# Patient Record
Sex: MALE | URBAN - METROPOLITAN AREA
[De-identification: names, ages, dates, MRNs, and addresses within clinical notes are randomized per-mention and may not be internally consistent; named-entity substitution may affect disease eponyms.]

---

## 2022-02-16 ENCOUNTER — OFFICE VISIT (OUTPATIENT)
Dept: URBAN - METROPOLITAN AREA CLINIC 5 | Facility: CLINIC | Age: 77
End: 2022-02-16
Payer: MEDICARE

## 2022-02-16 DIAGNOSIS — H02.834 DERMATOCHALASIS OF LEFT UPPER LID: ICD-10-CM

## 2022-02-16 DIAGNOSIS — Z96.1 PRESENCE OF INTRAOCULAR LENS: ICD-10-CM

## 2022-02-16 DIAGNOSIS — H02.831 DERMATOCHALASIS OF RIGHT UPPER LID: ICD-10-CM

## 2022-02-16 DIAGNOSIS — H34.8322 BRANCH RETINAL VEIN OCCLUSION, STABLE, LEFT EYE: Primary | ICD-10-CM

## 2022-02-16 PROCEDURE — 92004 COMPRE OPH EXAM NEW PT 1/>: CPT | Performed by: OPTOMETRIST

## 2022-02-16 PROCEDURE — 92250 FUNDUS PHOTOGRAPHY W/I&R: CPT | Performed by: OPTOMETRIST

## 2022-02-16 ASSESSMENT — INTRAOCULAR PRESSURE
OS: 10
OD: 9

## 2022-02-16 NOTE — IMPRESSION/PLAN
Impression: Branch retinal vein occlusion, stable, left eye: R10.8850. Plan: Mild hemorrhages so may be partial vein occlusion - baseline photos today. Recommend consult with Dr. Heather Joshi in 3-4 weeks.

## 2022-10-25 ENCOUNTER — OFFICE VISIT (OUTPATIENT)
Dept: URBAN - METROPOLITAN AREA CLINIC 1 | Facility: CLINIC | Age: 77
End: 2022-10-25
Payer: COMMERCIAL

## 2022-10-25 DIAGNOSIS — H34.8320 BRANCH RETINAL VEIN OCCLUSION W/ MACULAR EDEMA, LEFT EYE: Primary | ICD-10-CM

## 2022-10-25 DIAGNOSIS — Z96.1 PRESENCE OF INTRAOCULAR LENS: ICD-10-CM

## 2022-10-25 PROCEDURE — 92134 CPTRZ OPH DX IMG PST SGM RTA: CPT | Performed by: SPECIALIST

## 2022-10-25 PROCEDURE — 99214 OFFICE O/P EST MOD 30 MIN: CPT | Performed by: SPECIALIST

## 2022-10-25 ASSESSMENT — INTRAOCULAR PRESSURE
OD: 11
OS: 7

## 2022-10-25 NOTE — IMPRESSION/PLAN
Impression: Presence of intraocular lens: Z96.1. Plan: PC IOL in good position. Patient doing excellent after Cataract surgery both eyes, Will continue to monitor patient. 

rv 3 mos dfe

## 2022-10-25 NOTE — IMPRESSION/PLAN
Impression: Branch retinal vein occlusion w/ macular edema, left eye: B50.9889. Plan: PATIENT HAS A BRVO IN THE LEFT EYE , WHICH EXPLAINS HOW HE IS SEEING. HE WILL NEED TO SEE A RETINAL SPECIALIST [facility]. WE WILL SEND HIM TO EITHER DR Jay Jay Herrera, [address] [place] [address].

## 2022-11-01 ENCOUNTER — OFFICE VISIT (OUTPATIENT)
Dept: URBAN - METROPOLITAN AREA CLINIC 6 | Facility: CLINIC | Age: 77
End: 2022-11-01
Payer: MEDICARE

## 2022-11-01 DIAGNOSIS — H34.8120 CENTRAL RETINAL VEIN OCCLUSION W/ MACULAR EDEMA, LEFT EYE: Primary | ICD-10-CM

## 2022-11-01 DIAGNOSIS — H04.123 DRY EYE SYNDROME OF BILATERAL LACRIMAL GLANDS: ICD-10-CM

## 2022-11-01 PROCEDURE — 92014 COMPRE OPH EXAM EST PT 1/>: CPT | Performed by: OPHTHALMOLOGY

## 2022-11-01 PROCEDURE — 67028 INJECTION EYE DRUG: CPT | Performed by: OPHTHALMOLOGY

## 2022-11-01 PROCEDURE — 92134 CPTRZ OPH DX IMG PST SGM RTA: CPT | Performed by: OPHTHALMOLOGY

## 2022-11-01 ASSESSMENT — INTRAOCULAR PRESSURE
OS: 11
OD: 12

## 2022-11-01 NOTE — IMPRESSION/PLAN
Impression: Dry eye syndrome of bilateral lacrimal glands: H04.123. Plan: Mild dry eye- Recommend use of high quality artificial tears 3-4x per day prn.

## 2022-11-01 NOTE — IMPRESSION/PLAN
Impression: Central retinal vein occlusion w/ macular edema, left eye: H34.8120. Plan: CRVO with macular edema on moct and Fundus ordered today. R/B/A of off label Avastin (Bevacizumab) injection discussed and the patient elected to proceed. Recommend getting a baseline evaluation with PCP to check CBC/BS/Lipid profile / Hypercoaguable status: Protein S, Protein C, Homocysteine, antithrombin 3, Factor Vleiden and lupus anticoagulant. Baseline FP/MOCT with CME OU. R/B/A of off label Avastin (Bevacizumab) injection discussed and the patient elected to proceed. AVASTIN (Bevacizumab) injection given in the left eye today. Procedure: The patient was properly identified and the risks, benefits, and alternatives were fully discussed with the patient, including the off label use of this medication. All questions were answered, the patient decided to proceed and signed the informed consent. The surgical site was confirmed and a proparacaine drop was placed followed by iodine 5% topically on the eye. The periocular area was then prepped and a sterile eyelid speculum was used to open the lids. An additional 5% iodine drop was placed over the planned injection site. A small amount of subconjunctival lidocaine was placed and then the pars plana was marked 3.75mm posterior to the limbus with a sterile caliper. An additional 5% iodine drop was placed. 0.08 mL of Avastin (Bevacizumab 2.0 mg per 0.08 mL) was injected through the pars plana. An additional drop of iodine was placed. The eyelid speculum was removed. It was verified that the patient could count fingers. Indirect biomicroscopy was performed to view the retina to assure no changes, specifically no holes, tears, or new vitreous hemorrhaging. The injection site was  inspected in the slit lamp and noted to be free of vitreous. Endophthalmitis and retinal detachment warnings were given.

## 2022-12-01 ENCOUNTER — OFFICE VISIT (OUTPATIENT)
Dept: URBAN - METROPOLITAN AREA CLINIC 6 | Facility: CLINIC | Age: 77
End: 2022-12-01
Payer: MEDICARE

## 2022-12-01 DIAGNOSIS — H34.8120 CENTRAL RETINAL VEIN OCCLUSION W/ MACULAR EDEMA, LEFT EYE: Primary | ICD-10-CM

## 2022-12-01 DIAGNOSIS — H04.123 DRY EYE SYNDROME OF BILATERAL LACRIMAL GLANDS: ICD-10-CM

## 2022-12-01 PROCEDURE — 67028 INJECTION EYE DRUG: CPT | Performed by: OPHTHALMOLOGY

## 2022-12-01 PROCEDURE — 92014 COMPRE OPH EXAM EST PT 1/>: CPT | Performed by: OPHTHALMOLOGY

## 2022-12-01 PROCEDURE — 92134 CPTRZ OPH DX IMG PST SGM RTA: CPT | Performed by: OPHTHALMOLOGY

## 2022-12-01 ASSESSMENT — INTRAOCULAR PRESSURE
OD: 11
OS: 10

## 2022-12-01 NOTE — IMPRESSION/PLAN
Impression: Central retinal vein occlusion w/ macular edema, left eye: H34.8191. Plan: Ischemic CRVO with improving macular edema on moct today. R/B/A of off label Avastin (Bevacizumab) injection discussed and the patient elected to proceed. AVASTIN (Bevacizumab) injection given in the left eye today. Procedure: The patient was properly identified and the risks, benefits, and alternatives were fully discussed with the patient, including the off label use of this medication. All questions were answered, the patient decided to proceed and signed the informed consent. The surgical site was confirmed and a proparacaine drop was placed followed by iodine 5% topically on the eye. The periocular area was then prepped and a sterile eyelid speculum was used to open the lids. An additional 5% iodine drop was placed over the planned injection site. A small amount of subconjunctival lidocaine was placed and then the pars plana was marked 3.75mm posterior to the limbus with a sterile caliper. An additional 5% iodine drop was placed. 0.08 mL of Avastin (Bevacizumab 2.0 mg per 0.08 mL) was injected through the pars plana. An additional drop of iodine was placed. The eyelid speculum was removed. It was verified that the patient could count fingers. Indirect biomicroscopy was performed to view the retina to assure no changes, specifically no holes, tears, or new vitreous hemorrhaging. The injection site was  inspected in the slit lamp and noted to be free of vitreous. Endophthalmitis and retinal detachment warnings were given.

## 2022-12-09 ENCOUNTER — OFFICE VISIT (OUTPATIENT)
Dept: URBAN - METROPOLITAN AREA CLINIC 6 | Facility: CLINIC | Age: 77
End: 2022-12-09
Payer: MEDICARE

## 2022-12-09 DIAGNOSIS — H04.123 DRY EYE SYNDROME OF BILATERAL LACRIMAL GLANDS: Primary | ICD-10-CM

## 2022-12-09 DIAGNOSIS — H34.8120 CENTRAL RETINAL VEIN OCCLUSION W/ MACULAR EDEMA, LEFT EYE: ICD-10-CM

## 2022-12-09 PROCEDURE — 92014 COMPRE OPH EXAM EST PT 1/>: CPT | Performed by: OPTOMETRIST

## 2022-12-09 ASSESSMENT — INTRAOCULAR PRESSURE
OD: 12
OS: 14

## 2022-12-09 NOTE — IMPRESSION/PLAN
Impression: Central retinal vein occlusion w/ macular edema, left eye: H34.9389. Plan: The patient had avastin 1 week ago with Dr Bertin River and is improving OS. No signs of change OD. FU as scheduled with Dr Bertin River.

## 2022-12-09 NOTE — IMPRESSION/PLAN
Impression: Dry eye syndrome of bilateral lacrimal glands: H04.123. Plan: Emergency visit for Mild dry eye- Recommend use of high quality artificial tears 3-4x per day prn.

## 2023-01-18 ENCOUNTER — OFFICE VISIT (OUTPATIENT)
Dept: URBAN - METROPOLITAN AREA CLINIC 6 | Facility: CLINIC | Age: 78
End: 2023-01-18
Payer: MEDICARE

## 2023-01-18 DIAGNOSIS — H34.8120 CENTRAL RETINAL VEIN OCCLUSION, LEFT EYE, WITH MACULAR EDEMA: Primary | ICD-10-CM

## 2023-01-18 PROCEDURE — 92014 COMPRE OPH EXAM EST PT 1/>: CPT | Performed by: OPHTHALMOLOGY

## 2023-01-18 PROCEDURE — 92134 CPTRZ OPH DX IMG PST SGM RTA: CPT | Performed by: OPHTHALMOLOGY

## 2023-01-18 PROCEDURE — 67028 INJECTION EYE DRUG: CPT | Performed by: OPHTHALMOLOGY

## 2023-01-18 ASSESSMENT — INTRAOCULAR PRESSURE
OS: 10
OD: 11

## 2023-01-18 NOTE — IMPRESSION/PLAN
Impression: Central retinal vein occlusion, left eye, with macular edema: H34.8120. Plan: AVASTIN (Bevacizumab) injection given in the left eye today. Procedure: The patient was properly identified and the risks, benefits, and alternatives were fully discussed with the patient, including the off label use of this medication. All questions were answered, the patient decided to proceed and signed the informed consent. The surgical site was confirmed and a proparacaine drop was placed followed by iodine 5% topically on the eye. The periocular area was then prepped and a sterile eyelid speculum was used to open the lids. An additional 5% iodine drop was placed over the planned injection site. A small amount of subconjunctival lidocaine was placed and then the pars plana was marked 3.75mm posterior to the limbus with a sterile caliper. An additional 5% iodine drop was placed. 0.08 mL of Avastin (Bevacizumab 2.0 mg per 0.08 mL) was injected through the pars plana. An additional drop of iodine was placed. The eyelid speculum was removed. It was verified that the patient could count fingers. Indirect biomicroscopy was performed to view the retina to assure no changes, specifically no holes, tears, or new vitreous hemorrhaging. The injection site was  inspected in the slit lamp and noted to be free of vitreous. Endophthalmitis and retinal detachment warnings were given.

## 2023-02-22 ENCOUNTER — OFFICE VISIT (OUTPATIENT)
Dept: URBAN - METROPOLITAN AREA CLINIC 6 | Facility: CLINIC | Age: 78
End: 2023-02-22
Payer: MEDICARE

## 2023-02-22 DIAGNOSIS — H34.8120 CENTRAL RETINAL VEIN OCCLUSION W/ MACULAR EDEMA, LEFT EYE: Primary | ICD-10-CM

## 2023-02-22 PROCEDURE — 92250 FUNDUS PHOTOGRAPHY W/I&R: CPT | Performed by: OPHTHALMOLOGY

## 2023-02-22 PROCEDURE — 92014 COMPRE OPH EXAM EST PT 1/>: CPT | Performed by: OPHTHALMOLOGY

## 2023-02-22 ASSESSMENT — INTRAOCULAR PRESSURE
OD: 11
OS: 10

## 2023-02-22 NOTE — IMPRESSION/PLAN
Impression: Central retinal vein occlusion w/ macular edema, left eye: H34.8160. Plan: Ischemic CRVO with resolved macular edema on moct today s/p Avastin injections. FP/MOCT showing resolution of retinal bleeding. Guarded visual ptognosis explained given retinal ischemia. Will refer to [facility] retina specialist for continued care as I will be relocating out of town. Advised to contact his PCP/Cardiologist in regards to his headaches to ensure BP is under control.